# Patient Record
Sex: FEMALE | Race: BLACK OR AFRICAN AMERICAN | NOT HISPANIC OR LATINO | Employment: FULL TIME | URBAN - METROPOLITAN AREA
[De-identification: names, ages, dates, MRNs, and addresses within clinical notes are randomized per-mention and may not be internally consistent; named-entity substitution may affect disease eponyms.]

---

## 2017-04-15 ENCOUNTER — HOSPITAL ENCOUNTER (EMERGENCY)
Facility: HOSPITAL | Age: 27
Discharge: HOME | End: 2017-04-15
Attending: EMERGENCY MEDICINE
Payer: MEDICAID

## 2017-04-15 VITALS
HEIGHT: 63 IN | SYSTOLIC BLOOD PRESSURE: 143 MMHG | WEIGHT: 150 LBS | BODY MASS INDEX: 26.58 KG/M2 | RESPIRATION RATE: 14 BRPM | DIASTOLIC BLOOD PRESSURE: 84 MMHG | HEART RATE: 86 BPM | TEMPERATURE: 98 F | OXYGEN SATURATION: 99 %

## 2017-04-15 DIAGNOSIS — M79.671 BILATERAL FOOT PAIN: Primary | ICD-10-CM

## 2017-04-15 DIAGNOSIS — M79.672 BILATERAL FOOT PAIN: Primary | ICD-10-CM

## 2017-04-15 PROCEDURE — 99283 EMERGENCY DEPT VISIT LOW MDM: CPT | Mod: ,,, | Performed by: EMERGENCY MEDICINE

## 2017-04-15 PROCEDURE — 99283 EMERGENCY DEPT VISIT LOW MDM: CPT

## 2017-04-15 NOTE — ED TRIAGE NOTES
"Bonilla Charles, a 26 y.o. female presents to the ED      Chief Complaint   Patient presents with    Foot Pain     Pt reports going to [facility] and being told she has fungus on bilateral feet from placing feet in the [place]. Pt was given cream with no relief. Pt now states that there are "holes" in right foot and painful.     "

## 2017-04-15 NOTE — ED PROVIDER NOTES
"Encounter Date: 4/15/2017    SCRIBE #1 NOTE: I, Reginaldo Man, am scribing for, and in the presence of,  Dr. Singletary. I have scribed the entire note.       History     Chief Complaint   Patient presents with    Foot Pain     Pt reports going to [facility] and being told she has fungus on bilateral feet from placing feet in the [place]. Pt was given cream with no relief. Pt now states that there are "holes" in right foot and painful.     Review of patient's allergies indicates:  No Known Allergies  HPI Comments: Time seen by provider: 1:39 AM    This is a 26 y.o. female who presents to the ED for evaluation of bilateral feet pain. The patient states that she was diagnosed with a foot fungus after placing her feet the [place]. She was prescribed a topical cream, which she states originally helped her symptoms, but it no longer is alleviating the pain. She reports that she has been picking at the "bumps" on the bottom of her feet.       The history is provided by the patient.     No past medical history on file.  No past surgical history on file.  No family history on file.  Social History   Substance Use Topics    Smoking status: Current Every Day Smoker    Smokeless tobacco: Not on file    Alcohol use No     Review of Systems   Constitutional: Negative for fever.   HENT: Negative for sore throat.    Respiratory: Negative for shortness of breath.    Cardiovascular: Negative for chest pain.   Gastrointestinal: Negative for nausea.   Genitourinary: Negative for dysuria.   Musculoskeletal: Negative for back pain.        Foot pain and calluses.    Skin: Negative for rash.   Neurological: Negative for weakness.   Hematological: Does not bruise/bleed easily.       Physical Exam   Initial Vitals   BP Pulse Resp Temp SpO2   04/15/17 0106 04/15/17 0106 04/15/17 0106 04/15/17 0106 04/15/17 0106   143/84 86 14 97.7 °F (36.5 °C) 99 %     Physical Exam    Nursing note and vitals " reviewed.  Constitutional: She appears well-developed and well-nourished.   HENT:   Head: Normocephalic and atraumatic.   Eyes: Conjunctivae are normal.   Neck: Normal range of motion.   Cardiovascular: Normal rate.   Musculoskeletal: Normal range of motion.   Neurological: She is alert and oriented to person, place, and time. She has normal strength.   Skin: Skin is warm and dry.   Bilateral feet have no significant skin changes. There are some calluses and some area of callus that has the skin peeled back.          ED Course   Procedures  Labs Reviewed - No data to display          Medical Decision Making:   History:   Old Medical Records: I decided to obtain old medical records.  Initial Assessment:   Evaluation for bilateral feet pain. Feet do not appear to have a fungal nor bacterial infection. She appears to have blisters and calluses likely from standing on her feet all day as she complains about. Routine foot care was discussed with the patient. No indication for further imaging or antibiotics. Referred her to podiatry for worsening symptoms.             Scribe Attestation:   Scribe #1: I performed the above scribed service and the documentation accurately describes the services I performed. I attest to the accuracy of the note.    Attending Attestation:           Physician Attestation for Scribe:  Physician Attestation Statement for Scribe #1: I, Dr. Singletary, reviewed documentation, as scribed by Reginaldo Man in my presence, and it is both accurate and complete.                 ED Course     Clinical Impression:   The encounter diagnosis was Bilateral foot pain.    Disposition:   Disposition: Discharged  Condition: Stable       Karina Singletary MD  04/15/17 8728

## 2017-04-15 NOTE — DISCHARGE INSTRUCTIONS
Keep feet clean and dry  Wear gold bond powder, thick socks and supportive shoes  Take motrin as needed for pain   Follow up with primary care or foot doctor as needed

## 2017-04-15 NOTE — ED NOTES
"Adult Physical Assessment  LOC: Bonilla Charles, 26 y.o. female verified via two identifiers.  The patient is awake, alert, oriented and speaking appropriately at this time.  APPEARANCE: Patient resting comfortably and appears to be in no acute distress at this time. Patient is clean and well groomed, patient's clothing is properly fastened.  SKIN:The skin is warm and dry, color consistent with ethnicity, patient has normal skin turgor and moist mucus membranes, peeling areas noted to bilateral feet. Pt reports "bumps" that when she pops have purulent drainage. +itching.   MUSCULOSKELETAL: Patient moving all extremities well, no obvious swelling or deformities noted.  RESPIRATORY: Airway is open and patent, respirations are spontaneous, patient has a normal effort and rate, no accessory muscle use noted.  CARDIAC: Patient has a normal rate and rhythm, no periphreal edema noted in any extremity, capillary refill < 3 seconds in all extremities  ABDOMEN: Soft and non tender to palpation, no abdominal distention noted. Bowel sounds present in all four quadrants.  NEUROLOGIC: Eyes open spontaneously, behavior appropriate to situation, follows commands, facial expression symmetrical, bilateral hand grasp equal and even, purposeful motor response noted, normal sensation in all extremities when touched with a finger.   "

## 2017-04-15 NOTE — ED AVS SNAPSHOT
[facility]  [address]  [facility] [address]               Bonilla Charles   4/15/2017  1:09 AM   ED    Description:  Female : 1990   Department:  [facility]           Your Care was Coordinated By:     Provider Role From To    Karina Singletary MD Attending Provider 04/15/17 0138 --      Reason for Visit     Foot Pain           Diagnoses this Visit        Comments    Bilateral foot pain    -  Primary       ED Disposition     None           To Do List           Follow-up Information     Schedule an appointment as soon as possible for a visit with [facility].    Specialty:  Podiatry    Why:  For re-evaluation of your symptoms    Contact information:    [address]  [facility] [id number] [phone]    Additional information:    [facility] - [address]th Floor, [address]      [facility]     [facility] Nurse Care Line - 24/ Assistance  Unless otherwise directed by your provider, please contact [facility], our nurse care line that is available for / assistance.     Registered nurses in the [facility] provide: appointment scheduling, clinical advisement, health education, and other advisory services.  Call: [phone] (toll free)               Medications           Message regarding Medications     Verify the changes and/or additions to your medication regime listed below are the same as discussed with your clinician today.  If any of these changes or additions are incorrect, please notify your healthcare provider.             Verify that the below list of medications is an accurate representation of the medications you are currently taking.  If none reported, the list may be blank. If incorrect, please contact your healthcare provider. Carry this list with you in case of emergency.                Clinical Reference Information           Your Vitals Were     BP Pulse Temp Resp Height Weight     "143/84 86 97.7 °F (36.5 °C) (Oral) 14 5' 3" (1.6 m) 68 kg (150 lb)    Last Period SpO2 BMI          04/04/2017 99% 26.57 kg/m2        Allergies as of 4/15/2017     No Known Allergies      Immunizations Administered on Date of Encounter - 4/15/2017     None      ED Micro, Lab, POCT     None      ED Imaging Orders     None        Discharge Instructions       Keep feet clean and dry  Wear gold bond powder, thick socks and supportive shoes  Take motrin as needed for pain   Follow up with primary care or foot doctor as needed     [employer] Sign-Up     Activating your [facility] account is as easy as 1-2-3!     1) Visit [email or web address], select Sign Up Now, enter this activation code and your date of birth, then select Next.  [email or web address]  Expires: 5/30/2017  1:45 AM      2) Create a username and password to use when you visit [facility] in the future and select a security question in case you lose your password and select Next.    3) Enter your e-mail address and click Sign Up!    Additional Information  If you have questions, please e-mail [email or web address] or call [phone] to talk to our [facility] staff. Remember, [facility] is NOT to be used for urgent needs. For medical emergencies, dial 911.         Smoking Cessation     If you would like to quit smoking:   You may be eligible for free services if you are a [place] resident and started smoking cigarettes before September 1, 1990.  Call the Smoking Cessation Trust (S[employer]) toll free at (645) 228-1353 or (239) 372-6704.   Call 9-231-QUIT-NOW if you do not meet the above criteria.   Contact us via email: [email or web address]   View our website for more information: [email or web address]         [facility] complies with applicable Federal civil rights laws and does not discriminate on the basis of race, color, national origin, age, disability, or sex.        Language Assistance Services     ATTENTION: Language assistance services " are available, free of charge. Please call [phone].      ATENCIÓN: Si habla español, tiene a raphael disposición servicios gratuitos de asistencia lingüística. Llame al [phone].     CHÚ Ý: N?u b?n nói Ti?ng Vi?t, có các d?ch v? h? tr? ngôn ng? mi?n phí dành cho b?n. G?i s? [phone].

## 2024-05-13 ENCOUNTER — OFFICE VISIT (OUTPATIENT)
Dept: FAMILY MEDICINE | Facility: HOSPITAL | Age: 34
End: 2024-05-13
Payer: COMMERCIAL

## 2024-05-13 VITALS
DIASTOLIC BLOOD PRESSURE: 92 MMHG | HEIGHT: 63 IN | SYSTOLIC BLOOD PRESSURE: 139 MMHG | WEIGHT: 187.81 LBS | BODY MASS INDEX: 33.28 KG/M2 | HEART RATE: 78 BPM

## 2024-05-13 DIAGNOSIS — I10 HYPERTENSION, UNSPECIFIED TYPE: ICD-10-CM

## 2024-05-13 DIAGNOSIS — I10 ELEVATED BLOOD PRESSURE READING IN OFFICE WITH DIAGNOSIS OF HYPERTENSION: ICD-10-CM

## 2024-05-13 DIAGNOSIS — L50.9 URTICARIA: Primary | ICD-10-CM

## 2024-05-13 PROCEDURE — 99203 OFFICE O/P NEW LOW 30 MIN: CPT

## 2024-05-13 RX ORDER — CETIRIZINE HYDROCHLORIDE 10 MG/1
10 TABLET ORAL DAILY
Qty: 90 TABLET | Refills: 3 | Status: SHIPPED | OUTPATIENT
Start: 2024-05-13 | End: 2025-05-13

## 2024-05-13 RX ORDER — ZINC GLUCONATE 13.3 MG
400 LOZENGE ORAL 2 TIMES DAILY
Qty: 360 TABLET | Refills: 3 | Status: CANCELLED | OUTPATIENT
Start: 2024-05-13 | End: 2025-05-13

## 2024-05-13 NOTE — PROGRESS NOTES
[facility] FAMILY PRACTICE CLINIC NOTE  New Patient Visit      SUBJECTIVE:     Patient: Bonilla Charles is a 33 y.o. female presents with bumps to bilateral palms and soles of feet with associated pruritus    Chief Compliant:   Chief Complaint   Patient presents with    Establish Care       History of Present Illness:    Abbreviated visit secondary to time constraints.  Patient to return for establish care visit.    Bumps limited to bilateral palms and hands.  Patient also has history of eczema.  This episode 6 days ago, has occurred multiple times in past. Similar to past episodes.  States that this usually occurs during season changes. Patient uses sensitive skin lotion.  She does not use sensitive skin detergent.  Patient denies new sexual partners.  Has been sexually active with only her fiance for several years.  Not interested in STI testing at this time.  Denies rhinorrhea, fevers, chills, bumps to genital region.      Immunizations:  COVID - deferred  Flu - deferred  Tdap - UTD  HPV - deferred    Screenings:  Last HgbA1C - to be completed today  Last lipid panel - to be completed today  Last Hep C screening - to be completed today  Last HIV screening - to be completed today  Last STI (G/C) screening - deferred  Last pap smear - due, will return  Last depression screening - No HI/SI    Review of patient's allergies indicates:  No Known Allergies    No past medical history on file.    Current Outpatient Medications   Medication Instructions    cetirizine (ZYRTEC) 10 mg, Oral, Daily       No past surgical history on file.    No family history on file.    Social History     Tobacco Use    Smoking status: Every Day   Substance Use Topics    Alcohol use: No    Drug use: No          ROS  A 10+ review of systems was performed with pertinent positives and negatives noted above in the history of present illness. Other systems were negative unless otherwise specified.    OBJECTIVE:     Vital Signs (Most Recent)  Vitals:     "05/13/24 0925   BP: (!) 139/92   Pulse: 78   Weight: 85.2 kg (187 lb 13.3 oz)   Height: 5' 3" (1.6 m)     BMI: Body mass index is 33.27 kg/m².     Physical Exam:  Gen: No apparent distress, cooperative & interactive  CV: RRR, S1 and S2 present  Resp: CTAB, normal respiratory effort   Skin: Skin colored papules to bilateral hands and feet. No erythema or pustules.    ASSESSMENT:   Bonilla Charles is a 33 y.o. female who presents to clinic to for    1. Urticaria    2. Elevated blood pressure reading without diagnosis of hypertension         PLAN:       Urticaria  -     cetirizine (ZYRTEC) 10 MG tablet; Take 1 tablet (10 mg total) by mouth once daily.  Dispense: 90 tablet; Refill: 3  - Encouraged patient to use sensitive skin detergent, continue to use sensitive skin and fragrance free lotion. Avoid NSAIDs as known to exacerbate symptoms.     Elevated blood pressure reading with diagnosis of hypertension  - On recheck, /89  - Patient would not like to start medication at this time, discussed to revisit at follow up appointment either at our clinic or at [facility] or [facility] as patient self-pay.    - Explained importance of lifestyle modifications, medication compliance, and recording routine blood pressure monitoring either at home or office in a log to ensure BP is controlled. Given counseling on need to keep blood pressure at least 140/90 and preferably 120/80 which is normal. Explained the increased risks for cardiovascular disease when BP is uncontrolled. Patient given counseling on signs and symptoms of possible elevated blood pressure.    - Counseled patient on the importance maintaining a healthy diet (including at least one-half of food eaten should be whole fruits and vegetables with the core elements of the other half of food  should include grains, whole grains, dairy, protein, and oils with lower saturated fat, as well as minimizing alcohol use and consumption of foods with added " sugar, saturated fat, and sodium.)     - Counseled patient on importance of exercising at least 150-300 minutes per week (i.e. at least 30 minutes, 3 days a week) of moderate physical activity.        Provided patient with anticipatory guidance and return precautions. Treatment plan discussed with patient, all questions answered, and patient acknowledged understanding and verbal agreement.      Follow-up in: 2 weeks if no improvement; or sooner PRN if acute concerns arise.      Case discussed with Dr. LY Long